# Patient Record
Sex: MALE | Race: WHITE | NOT HISPANIC OR LATINO | URBAN - METROPOLITAN AREA
[De-identification: names, ages, dates, MRNs, and addresses within clinical notes are randomized per-mention and may not be internally consistent; named-entity substitution may affect disease eponyms.]

---

## 2020-01-20 ENCOUNTER — APPOINTMENT (RX ONLY)
Dept: URBAN - METROPOLITAN AREA CLINIC 319 | Facility: CLINIC | Age: 55
Setting detail: DERMATOLOGY
End: 2020-01-20

## 2020-01-20 DIAGNOSIS — L71.8 OTHER ROSACEA: ICD-10-CM

## 2020-01-20 DIAGNOSIS — D18.0 HEMANGIOMA: ICD-10-CM

## 2020-01-20 DIAGNOSIS — L81.4 OTHER MELANIN HYPERPIGMENTATION: ICD-10-CM

## 2020-01-20 DIAGNOSIS — D22 MELANOCYTIC NEVI: ICD-10-CM

## 2020-01-20 DIAGNOSIS — L82.1 OTHER SEBORRHEIC KERATOSIS: ICD-10-CM

## 2020-01-20 PROBLEM — D18.01 HEMANGIOMA OF SKIN AND SUBCUTANEOUS TISSUE: Status: ACTIVE | Noted: 2020-01-20

## 2020-01-20 PROBLEM — D22.9 MELANOCYTIC NEVI, UNSPECIFIED: Status: ACTIVE | Noted: 2020-01-20

## 2020-01-20 PROCEDURE — ? SUNSCREEN RECOMMENDATIONS

## 2020-01-20 PROCEDURE — ? COUNSELING

## 2020-01-20 PROCEDURE — 99203 OFFICE O/P NEW LOW 30 MIN: CPT

## 2020-01-20 PROCEDURE — ? PRESCRIPTION

## 2020-01-20 RX ORDER — METRONIDAZOLE 10 MG/G
GEL TOPICAL QHS
Qty: 3 | Refills: 3 | Status: ERX | COMMUNITY
Start: 2020-01-20

## 2020-01-20 RX ORDER — AZELAIC ACID 0.15 G/G
GEL TOPICAL
Qty: 3 | Refills: 3 | Status: ERX | COMMUNITY
Start: 2020-01-20

## 2020-01-20 RX ORDER — SULFACETAMIDE SODIUM AND SULFUR 100; 50 MG/G; MG/G
CREAM TOPICAL
Qty: 3 | Refills: 3 | Status: ERX | COMMUNITY
Start: 2020-01-20

## 2020-01-20 RX ADMIN — AZELAIC ACID: 0.15 GEL TOPICAL at 00:00

## 2020-01-20 RX ADMIN — SULFACETAMIDE SODIUM AND SULFUR: 100; 50 CREAM TOPICAL at 00:00

## 2020-01-20 RX ADMIN — METRONIDAZOLE: 10 GEL TOPICAL at 00:00

## 2020-01-20 ASSESSMENT — LOCATION DETAILED DESCRIPTION DERM
LOCATION DETAILED: NASAL DORSUM
LOCATION DETAILED: LEFT CENTRAL MALAR CHEEK
LOCATION DETAILED: RIGHT CENTRAL MALAR CHEEK

## 2020-01-20 ASSESSMENT — LOCATION SIMPLE DESCRIPTION DERM
LOCATION SIMPLE: NOSE
LOCATION SIMPLE: LEFT CHEEK
LOCATION SIMPLE: RIGHT CHEEK

## 2020-01-20 ASSESSMENT — LOCATION ZONE DERM
LOCATION ZONE: NOSE
LOCATION ZONE: FACE

## 2020-11-06 ENCOUNTER — APPOINTMENT (RX ONLY)
Dept: URBAN - METROPOLITAN AREA CLINIC 319 | Facility: CLINIC | Age: 55
Setting detail: DERMATOLOGY
End: 2020-11-06

## 2020-11-06 VITALS — TEMPERATURE: 97.8 F

## 2020-11-06 DIAGNOSIS — L81.4 OTHER MELANIN HYPERPIGMENTATION: ICD-10-CM

## 2020-11-06 DIAGNOSIS — D22 MELANOCYTIC NEVI: ICD-10-CM

## 2020-11-06 DIAGNOSIS — L73.8 OTHER SPECIFIED FOLLICULAR DISORDERS: ICD-10-CM

## 2020-11-06 DIAGNOSIS — L71.8 OTHER ROSACEA: ICD-10-CM

## 2020-11-06 DIAGNOSIS — L82.1 OTHER SEBORRHEIC KERATOSIS: ICD-10-CM

## 2020-11-06 DIAGNOSIS — I78.8 OTHER DISEASES OF CAPILLARIES: ICD-10-CM

## 2020-11-06 DIAGNOSIS — D18.0 HEMANGIOMA: ICD-10-CM

## 2020-11-06 PROBLEM — D18.01 HEMANGIOMA OF SKIN AND SUBCUTANEOUS TISSUE: Status: ACTIVE | Noted: 2020-11-06

## 2020-11-06 PROBLEM — D22.5 MELANOCYTIC NEVI OF TRUNK: Status: ACTIVE | Noted: 2020-11-06

## 2020-11-06 PROCEDURE — ? COUNSELING

## 2020-11-06 PROCEDURE — ? PRESCRIPTION

## 2020-11-06 PROCEDURE — 99213 OFFICE O/P EST LOW 20 MIN: CPT

## 2020-11-06 PROCEDURE — ? FULL BODY SKIN EXAM

## 2020-11-06 PROCEDURE — ? ADDITIONAL NOTES

## 2020-11-06 RX ORDER — SULFACETAMIDE SODIUM AND SULFUR 100; 50 MG/G; MG/G
CREAM TOPICAL
Qty: 3 | Refills: 3 | Status: ERX

## 2020-11-06 RX ORDER — AZELAIC ACID 0.15 G/G
GEL TOPICAL
Qty: 3 | Refills: 3 | Status: ERX

## 2020-11-06 RX ORDER — METRONIDAZOLE 10 MG/G
GEL TOPICAL QHS
Qty: 3 | Refills: 3 | Status: ERX

## 2020-11-06 ASSESSMENT — LOCATION DETAILED DESCRIPTION DERM
LOCATION DETAILED: EPIGASTRIC SKIN
LOCATION DETAILED: RIGHT CENTRAL MALAR CHEEK
LOCATION DETAILED: SUPERIOR THORACIC SPINE
LOCATION DETAILED: PERIUMBILICAL SKIN
LOCATION DETAILED: SUPERIOR LUMBAR SPINE
LOCATION DETAILED: RIGHT MEDIAL MALAR CHEEK
LOCATION DETAILED: LEFT INFERIOR CENTRAL MALAR CHEEK
LOCATION DETAILED: SUPERIOR MID FOREHEAD
LOCATION DETAILED: NASAL TIP

## 2020-11-06 ASSESSMENT — LOCATION ZONE DERM
LOCATION ZONE: TRUNK
LOCATION ZONE: NOSE
LOCATION ZONE: FACE

## 2020-11-06 ASSESSMENT — LOCATION SIMPLE DESCRIPTION DERM
LOCATION SIMPLE: NOSE
LOCATION SIMPLE: SUPERIOR FOREHEAD
LOCATION SIMPLE: LEFT CHEEK
LOCATION SIMPLE: UPPER BACK
LOCATION SIMPLE: RIGHT CHEEK
LOCATION SIMPLE: LOWER BACK
LOCATION SIMPLE: ABDOMEN

## 2020-11-11 ENCOUNTER — APPOINTMENT (RX ONLY)
Dept: URBAN - METROPOLITAN AREA CLINIC 322 | Facility: CLINIC | Age: 55
Setting detail: DERMATOLOGY
End: 2020-11-11

## 2020-11-11 VITALS — TEMPERATURE: 98 F

## 2020-11-11 DIAGNOSIS — I78.8 OTHER DISEASES OF CAPILLARIES: ICD-10-CM

## 2020-11-11 PROCEDURE — ? VARILITE

## 2020-11-11 ASSESSMENT — LOCATION SIMPLE DESCRIPTION DERM: LOCATION SIMPLE: LEFT NOSE

## 2020-11-11 ASSESSMENT — LOCATION ZONE DERM: LOCATION ZONE: NOSE

## 2020-11-11 ASSESSMENT — LOCATION DETAILED DESCRIPTION DERM: LOCATION DETAILED: LEFT NASAL ALA

## 2020-11-11 NOTE — PROCEDURE: VARILITE
Frequency In Hz: 2
Wavelength: 532 nm
Pulse Duration: 5
Spot Size In Mm: 1.0 mm
External Cooling Fan Speed: 0
Treatment Number: 1
Energy Density: 360 J/cm2
Detail Level: Detailed
Post-Care Instructions: I reviewed with the patient in detail post-care instructions. Patient should avoid sunlight and wear sun protection.
Price (Use Numbers Only, No Special Characters Or $): 175
Energy Density: 950 J/cm2
Wavelength: 940 nm
Pulse Duration: 40
Consent: Written consent obtained, risks reviewed including but not limited to crusting, scabbing, blistering, scarring, darker or lighter pigmentary change, systemic reactions, ulceration, incidental hair removal, bruising, and/or incomplete removal.
Spot Size In Mm: 0.7 mm

## 2021-01-18 ENCOUNTER — APPOINTMENT (RX ONLY)
Dept: URBAN - METROPOLITAN AREA CLINIC 322 | Facility: CLINIC | Age: 56
Setting detail: DERMATOLOGY
End: 2021-01-18

## 2021-01-18 VITALS — TEMPERATURE: 99.1 F

## 2021-01-18 DIAGNOSIS — L73.8 OTHER SPECIFIED FOLLICULAR DISORDERS: ICD-10-CM

## 2021-01-18 DIAGNOSIS — I78.8 OTHER DISEASES OF CAPILLARIES: ICD-10-CM

## 2021-01-18 PROCEDURE — ? COUNSELING

## 2021-01-18 PROCEDURE — ? VARILITE

## 2021-01-18 PROCEDURE — ? FRACTIONAL ERBIUM ABLATIVE LASER

## 2021-01-18 ASSESSMENT — LOCATION DETAILED DESCRIPTION DERM
LOCATION DETAILED: LEFT NASAL ALA
LOCATION DETAILED: LEFT FOREHEAD
LOCATION DETAILED: RIGHT SUPERIOR MEDIAL FOREHEAD
LOCATION DETAILED: LEFT SUPERIOR FOREHEAD
LOCATION DETAILED: RIGHT FOREHEAD

## 2021-01-18 ASSESSMENT — LOCATION ZONE DERM
LOCATION ZONE: FACE
LOCATION ZONE: NOSE

## 2021-01-18 ASSESSMENT — LOCATION SIMPLE DESCRIPTION DERM
LOCATION SIMPLE: RIGHT FOREHEAD
LOCATION SIMPLE: LEFT FOREHEAD
LOCATION SIMPLE: LEFT NOSE

## 2021-01-18 NOTE — PROCEDURE: FRACTIONAL ERBIUM ABLATIVE LASER
Pre-Procedure Text: After consent was obtained and the procedure was explained, all persons present in the exam room put on their protective eyewear.
Energy In Mj/Cm2: 30
Post-Procedure Text: Post-care instructions were discussed.
Treatment Site: forehead
Spot Size: 2mm
Post-Care Instructions: I reviewed with the patient in detail post-care instructions. Patient is to apply vaseline with a q-tip to all crusted areas, and avoid picking at any scabs. Pt should stay away from the sun and wear sun protection until fully healed.
Procedural Text: The treatment areas where then treated as noted above.
Consent: Written consent obtained, risks reviewed including but not limited to crusting, scabbing, blistering, scarring, darker or lighter pigmentary change, and/or incomplete removal.
Price (Use Numbers Only, No Special Characters Or $): 500
Detail Level: Detailed
External Cooling Fan Speed: 5

## 2021-01-18 NOTE — PROCEDURE: VARILITE
Frequency In Hz: 2
Wavelength: 532 nm
Pulse Duration: 5
Spot Size In Mm: 1.0 mm
External Cooling Fan Speed: 0
Energy Density: 400 J/cm2
Frequency In Hz: 1
Detail Level: Detailed
Post-Care Instructions: I reviewed with the patient in detail post-care instructions. Patient should avoid sunlight and wear sun protection.
Energy Density: 950 J/cm2
Wavelength: 940 nm
Pulse Duration: 45
Consent: Written consent obtained, risks reviewed including but not limited to crusting, scabbing, blistering, scarring, darker or lighter pigmentary change, systemic reactions, ulceration, incidental hair removal, bruising, and/or incomplete removal.
Endpoint: 400 J used instead of 360J, as laser output low.  Endpoint was eradication of the vessels.
Spot Size In Mm: 0.7 mm

## 2021-06-15 ENCOUNTER — RX ONLY (OUTPATIENT)
Age: 56
Setting detail: RX ONLY
End: 2021-06-15

## 2021-06-15 RX ORDER — AZELAIC ACID 0.15 G/G
GEL TOPICAL
Qty: 3 | Refills: 3 | Status: ERX

## 2021-11-11 ENCOUNTER — APPOINTMENT (RX ONLY)
Dept: URBAN - METROPOLITAN AREA CLINIC 319 | Facility: CLINIC | Age: 56
Setting detail: DERMATOLOGY
End: 2021-11-11

## 2021-11-11 DIAGNOSIS — L82.1 OTHER SEBORRHEIC KERATOSIS: ICD-10-CM

## 2021-11-11 DIAGNOSIS — D22 MELANOCYTIC NEVI: ICD-10-CM

## 2021-11-11 DIAGNOSIS — D18.0 HEMANGIOMA: ICD-10-CM

## 2021-11-11 DIAGNOSIS — L71.8 OTHER ROSACEA: ICD-10-CM

## 2021-11-11 DIAGNOSIS — L72.8 OTHER FOLLICULAR CYSTS OF THE SKIN AND SUBCUTANEOUS TISSUE: ICD-10-CM | Status: WORSENING

## 2021-11-11 DIAGNOSIS — L81.4 OTHER MELANIN HYPERPIGMENTATION: ICD-10-CM

## 2021-11-11 PROBLEM — D22.39 MELANOCYTIC NEVI OF OTHER PARTS OF FACE: Status: ACTIVE | Noted: 2021-11-11

## 2021-11-11 PROBLEM — D18.01 HEMANGIOMA OF SKIN AND SUBCUTANEOUS TISSUE: Status: ACTIVE | Noted: 2021-11-11

## 2021-11-11 PROCEDURE — 99214 OFFICE O/P EST MOD 30 MIN: CPT

## 2021-11-11 PROCEDURE — ? FULL BODY SKIN EXAM

## 2021-11-11 PROCEDURE — ? PRESCRIPTION

## 2021-11-11 PROCEDURE — ? SUNSCREEN TREATMENT REGIMEN

## 2021-11-11 PROCEDURE — ? COUNSELING

## 2021-11-11 RX ORDER — METRONIDAZOLE 10 MG/G
GEL TOPICAL
Qty: 60 | Refills: 11 | Status: ERX

## 2021-11-11 RX ORDER — AZELAIC ACID 0.15 G/G
GEL TOPICAL
Qty: 50 | Refills: 11 | Status: ERX

## 2021-11-11 RX ORDER — SULFACETAMIDE SODIUM AND SULFUR 10; 5 MG/G; MG/G
RINSE TOPICAL
Qty: 227 | Refills: 11 | Status: ERX | COMMUNITY
Start: 2021-11-11

## 2021-11-11 RX ADMIN — SULFACETAMIDE SODIUM AND SULFUR: 10; 5 RINSE TOPICAL at 00:00

## 2021-11-11 ASSESSMENT — LOCATION ZONE DERM
LOCATION ZONE: SCALP
LOCATION ZONE: LEG
LOCATION ZONE: ARM
LOCATION ZONE: FACE
LOCATION ZONE: TRUNK

## 2021-11-11 ASSESSMENT — LOCATION SIMPLE DESCRIPTION DERM
LOCATION SIMPLE: RIGHT UPPER BACK
LOCATION SIMPLE: LEFT FOREARM
LOCATION SIMPLE: RIGHT FOREARM
LOCATION SIMPLE: POSTERIOR SCALP
LOCATION SIMPLE: RIGHT LOWER BACK
LOCATION SIMPLE: CHEST
LOCATION SIMPLE: RIGHT THIGH
LOCATION SIMPLE: RIGHT CHEEK
LOCATION SIMPLE: LEFT CHEEK

## 2021-11-11 ASSESSMENT — LOCATION DETAILED DESCRIPTION DERM
LOCATION DETAILED: LEFT INFERIOR LATERAL MALAR CHEEK
LOCATION DETAILED: RIGHT INFERIOR UPPER BACK
LOCATION DETAILED: RIGHT LATERAL UPPER BACK
LOCATION DETAILED: RIGHT SUPERIOR MEDIAL MIDBACK
LOCATION DETAILED: LEFT MEDIAL INFERIOR CHEST
LOCATION DETAILED: RIGHT INFERIOR CENTRAL MALAR CHEEK
LOCATION DETAILED: RIGHT DISTAL DORSAL FOREARM
LOCATION DETAILED: LEFT CENTRAL MALAR CHEEK
LOCATION DETAILED: RIGHT INFERIOR POSTAURICULAR SKIN
LOCATION DETAILED: LEFT PROXIMAL DORSAL FOREARM
LOCATION DETAILED: LEFT DISTAL DORSAL FOREARM
LOCATION DETAILED: RIGHT INFERIOR LATERAL MALAR CHEEK
LOCATION DETAILED: RIGHT PROXIMAL DORSAL FOREARM
LOCATION DETAILED: RIGHT ANTERIOR PROXIMAL THIGH

## 2021-11-11 NOTE — PROCEDURE: COUNSELING
Detail Level: Generalized
Patient Specific Counseling (Will Not Stick From Patient To Patient): Discussed isotretinoin at length. Patient would like clearance from MS specialist prior to proceding.
Detail Level: Simple
Detail Level: Zone

## 2022-01-05 ENCOUNTER — APPOINTMENT (RX ONLY)
Dept: URBAN - METROPOLITAN AREA CLINIC 321 | Facility: CLINIC | Age: 57
Setting detail: DERMATOLOGY
End: 2022-01-05

## 2022-01-05 DIAGNOSIS — L0390 CELLULITIS AND ABSCESS OF UNSPECIFIED SITES: ICD-10-CM | Status: WORSENING

## 2022-01-05 DIAGNOSIS — L0391 CELLULITIS AND ABSCESS OF UNSPECIFIED SITES: ICD-10-CM | Status: WORSENING

## 2022-01-05 PROBLEM — L02.11 CUTANEOUS ABSCESS OF NECK: Status: ACTIVE | Noted: 2022-01-05

## 2022-01-05 PROCEDURE — ? INCISION AND DRAINAGE

## 2022-01-05 PROCEDURE — ? FULL BODY SKIN EXAM - DECLINED

## 2022-01-05 PROCEDURE — ? PRESCRIPTION SAMPLES PROVIDED

## 2022-01-05 PROCEDURE — 10060 I&D ABSCESS SIMPLE/SINGLE: CPT

## 2022-01-05 ASSESSMENT — LOCATION ZONE DERM: LOCATION ZONE: NECK

## 2022-01-05 ASSESSMENT — LOCATION DETAILED DESCRIPTION DERM: LOCATION DETAILED: RIGHT LATERAL NECK

## 2022-01-05 ASSESSMENT — LOCATION SIMPLE DESCRIPTION DERM: LOCATION SIMPLE: POSTERIOR NECK

## 2022-01-05 NOTE — PROCEDURE: INCISION AND DRAINAGE
Detail Level: Detailed
Lesion Type: Abscess
Method: 11 blade
Curette: Yes
Include Sutures?: No
Anesthesia Type: 1% lidocaine with epinephrine
Anesthesia Volume In Cc: 1
Size Of Lesion In Cm (Optional But May Be Required For Some Insurances): 0
Drainage Type?: purulent  and cyst-like
Wound Care: Petrolatum
Dressing: dry sterile dressing
Epidermal Sutures: 4-0 Ethilon
Epidermal Closure: simple interrupted
Suture Text: The incision was partially closed with
Preparation Text: The area was prepped in the usual clean fashion.
Consent was obtained and risks were reviewed including but not limited to delayed wound healing, infection, need for multiple I and D's, and pain.
Post-Care Instructions: I reviewed with the patient in detail post-care instructions. Patient should keep wound covered and call the office should any redness, pain, swelling or worsening occur.

## 2022-01-17 ENCOUNTER — APPOINTMENT (RX ONLY)
Dept: URBAN - METROPOLITAN AREA CLINIC 322 | Facility: CLINIC | Age: 57
Setting detail: DERMATOLOGY
End: 2022-01-17

## 2022-01-17 DIAGNOSIS — I78.8 OTHER DISEASES OF CAPILLARIES: ICD-10-CM

## 2022-01-17 PROCEDURE — ? PULSED-DYE LASER

## 2022-01-17 PROCEDURE — ? FULL BODY SKIN EXAM - DECLINED

## 2022-01-17 ASSESSMENT — LOCATION ZONE DERM: LOCATION ZONE: NOSE

## 2022-01-17 ASSESSMENT — LOCATION DETAILED DESCRIPTION DERM: LOCATION DETAILED: LEFT NASAL ALA

## 2022-01-17 ASSESSMENT — LOCATION SIMPLE DESCRIPTION DERM: LOCATION SIMPLE: LEFT NOSE
